# Patient Record
Sex: MALE | Race: BLACK OR AFRICAN AMERICAN | NOT HISPANIC OR LATINO | URBAN - METROPOLITAN AREA
[De-identification: names, ages, dates, MRNs, and addresses within clinical notes are randomized per-mention and may not be internally consistent; named-entity substitution may affect disease eponyms.]

---

## 2022-08-13 ENCOUNTER — HOSPITAL ENCOUNTER (EMERGENCY)
Facility: OTHER | Age: 37
Discharge: HOME | End: 2022-08-13
Attending: EMERGENCY MEDICINE
Payer: COMMERCIAL

## 2022-08-13 VITALS
OXYGEN SATURATION: 98 % | RESPIRATION RATE: 19 BRPM | HEIGHT: 72 IN | SYSTOLIC BLOOD PRESSURE: 140 MMHG | HEART RATE: 72 BPM | WEIGHT: 315 LBS | TEMPERATURE: 98 F | DIASTOLIC BLOOD PRESSURE: 75 MMHG | BODY MASS INDEX: 42.66 KG/M2

## 2022-08-13 DIAGNOSIS — R07.9 CHEST PAIN: Primary | ICD-10-CM

## 2022-08-13 LAB
ALBUMIN SERPL BCP-MCNC: 4.1 G/DL (ref 3.5–5.2)
ALP SERPL-CCNC: 65 U/L (ref 55–135)
ALT SERPL W/O P-5'-P-CCNC: 24 U/L (ref 10–44)
ANION GAP SERPL CALC-SCNC: 12 MMOL/L (ref 8–16)
AST SERPL-CCNC: 21 U/L (ref 10–40)
BASOPHILS # BLD AUTO: 0.02 K/UL (ref 0–0.2)
BASOPHILS NFR BLD: 0.4 % (ref 0–1.9)
BILIRUB SERPL-MCNC: 1.1 MG/DL (ref 0.1–1)
BNP SERPL-MCNC: 12 PG/ML (ref 0–99)
BUN SERPL-MCNC: 9 MG/DL (ref 6–20)
CALCIUM SERPL-MCNC: 9.9 MG/DL (ref 8.7–10.5)
CHLORIDE SERPL-SCNC: 103 MMOL/L (ref 95–110)
CO2 SERPL-SCNC: 26 MMOL/L (ref 23–29)
CREAT SERPL-MCNC: 1.3 MG/DL (ref 0.5–1.4)
D DIMER PPP IA.FEU-MCNC: 0.25 MG/L FEU
DIFFERENTIAL METHOD: ABNORMAL
EOSINOPHIL # BLD AUTO: 0.1 K/UL (ref 0–0.5)
EOSINOPHIL NFR BLD: 1.6 % (ref 0–8)
ERYTHROCYTE [DISTWIDTH] IN BLOOD BY AUTOMATED COUNT: 13.1 % (ref 11.5–14.5)
EST. GFR  (NO RACE VARIABLE): >60 ML/MIN/1.73 M^2
GLUCOSE SERPL-MCNC: 100 MG/DL (ref 70–110)
HCT VFR BLD AUTO: 42.9 % (ref 40–54)
HGB BLD-MCNC: 14.8 G/DL (ref 14–18)
IMM GRANULOCYTES # BLD AUTO: 0.02 K/UL (ref 0–0.04)
IMM GRANULOCYTES NFR BLD AUTO: 0.4 % (ref 0–0.5)
LYMPHOCYTES # BLD AUTO: 1.4 K/UL (ref 1–4.8)
LYMPHOCYTES NFR BLD: 26.2 % (ref 18–48)
MCH RBC QN AUTO: 31.3 PG (ref 27–31)
MCHC RBC AUTO-ENTMCNC: 34.5 G/DL (ref 32–36)
MCV RBC AUTO: 91 FL (ref 82–98)
MONOCYTES # BLD AUTO: 0.4 K/UL (ref 0.3–1)
MONOCYTES NFR BLD: 7.2 % (ref 4–15)
NEUTROPHILS # BLD AUTO: 3.3 K/UL (ref 1.8–7.7)
NEUTROPHILS NFR BLD: 64.2 % (ref 38–73)
NRBC BLD-RTO: 0 /100 WBC
PLATELET # BLD AUTO: 206 K/UL (ref 150–450)
PMV BLD AUTO: 11 FL (ref 9.2–12.9)
POTASSIUM SERPL-SCNC: 4.1 MMOL/L (ref 3.5–5.1)
PROT SERPL-MCNC: 7.6 G/DL (ref 6–8.4)
RBC # BLD AUTO: 4.73 M/UL (ref 4.6–6.2)
SODIUM SERPL-SCNC: 141 MMOL/L (ref 136–145)
TROPONIN I SERPL DL<=0.01 NG/ML-MCNC: 0.01 NG/ML (ref 0–0.03)
WBC # BLD AUTO: 5.16 K/UL (ref 3.9–12.7)

## 2022-08-13 PROCEDURE — 93005 ELECTROCARDIOGRAM TRACING: CPT

## 2022-08-13 PROCEDURE — 80053 COMPREHEN METABOLIC PANEL: CPT | Performed by: PHYSICIAN ASSISTANT

## 2022-08-13 PROCEDURE — 93010 EKG 12-LEAD: ICD-10-PCS | Mod: ,,, | Performed by: INTERNAL MEDICINE

## 2022-08-13 PROCEDURE — 85025 COMPLETE CBC W/AUTO DIFF WBC: CPT | Performed by: PHYSICIAN ASSISTANT

## 2022-08-13 PROCEDURE — 84484 ASSAY OF TROPONIN QUANT: CPT | Performed by: PHYSICIAN ASSISTANT

## 2022-08-13 PROCEDURE — 99284 EMERGENCY DEPT VISIT MOD MDM: CPT | Mod: 25

## 2022-08-13 PROCEDURE — 93010 ELECTROCARDIOGRAM REPORT: CPT | Mod: ,,, | Performed by: INTERNAL MEDICINE

## 2022-08-13 PROCEDURE — 83880 ASSAY OF NATRIURETIC PEPTIDE: CPT | Performed by: PHYSICIAN ASSISTANT

## 2022-08-13 PROCEDURE — 25000003 PHARM REV CODE 250: Performed by: PHYSICIAN ASSISTANT

## 2022-08-13 PROCEDURE — 85379 FIBRIN DEGRADATION QUANT: CPT | Performed by: PHYSICIAN ASSISTANT

## 2022-08-13 PROCEDURE — 36415 COLL VENOUS BLD VENIPUNCTURE: CPT | Performed by: PHYSICIAN ASSISTANT

## 2022-08-13 RX ORDER — IBUPROFEN 600 MG/1
600 TABLET ORAL
Status: COMPLETED | OUTPATIENT
Start: 2022-08-13 | End: 2022-08-13

## 2022-08-13 RX ADMIN — IBUPROFEN 600 MG: 600 TABLET ORAL at 12:08

## 2022-08-13 NOTE — ED PROVIDER NOTES
Encounter Date: 8/13/2022       History     Chief Complaint   Patient presents with    Chest Pain     Referred from Urgent Care C/o non radiating continuous left chest pain 6/10 x 1 day with no relief. -SOB VSS.     Patient is a 37-year-old male with no significant medical history presents to the emergency department with chest pain.  Patient states he is here visiting from [place].  He states on Wednesday evening he was doing some heavy lifting for working out.  He states he did not notice any pain right after.  He states there is a he woke up with a sharp burning pain under his left breast.  He states he is seen and it was acid reflux.  He states he took Tums and other over-the-counter medications with no relief of his symptoms.  He states the pain persisted.  He states the pain was worse with lying flat and also taking deep breaths.  He denies any recent illness.  He denies recent cough.  He denies shortness of breath.  He denies lower extremity swelling.  He states he did just travel from [place] to [place] to [place].  States he was seen at urgent care just prior to arrival and sent here for evaluation.    The history is provided by the patient.     Review of patient's allergies indicates:   Allergen Reactions    Sulfa (sulfonamide antibiotics)      History reviewed. No pertinent past medical history.  History reviewed. No pertinent surgical history.  History reviewed. No pertinent family history.  Social History     Tobacco Use    Smoking status: Never Smoker    Smokeless tobacco: Never Used   Substance Use Topics    Alcohol use: Yes     Comment: occ    Drug use: Not Currently     Types: Marijuana     Review of Systems   Constitutional: Negative for activity change, appetite change, chills, fatigue and fever.   HENT: Negative for congestion, ear discharge, ear pain, postnasal drip, rhinorrhea, sore throat and trouble swallowing.    Respiratory: Negative for cough and shortness of breath.     Cardiovascular: Positive for chest pain. Negative for palpitations and leg swelling.   Gastrointestinal: Negative for abdominal pain, blood in stool, constipation, diarrhea, nausea and vomiting.   Genitourinary: Negative for dysuria, flank pain and hematuria.   Musculoskeletal: Negative for back pain, neck pain and neck stiffness.   Skin: Negative for rash and wound.   Neurological: Negative for dizziness, weakness, light-headedness and headaches.       Physical Exam     Initial Vitals [08/13/22 1019]   BP Pulse Resp Temp SpO2   (!) 142/83 75 18 97.8 °F (36.6 °C) 98 %      MAP       --         Physical Exam    Nursing note and vitals reviewed.  Constitutional: He appears well-developed and well-nourished. He is not diaphoretic.  Non-toxic appearance. No distress.   HENT:   Head: Normocephalic.   Right Ear: Hearing and external ear normal.   Left Ear: Hearing and external ear normal.   Nose: Nose normal.   Mouth/Throat: Uvula is midline, oropharynx is clear and moist and mucous membranes are normal. No oropharyngeal exudate.   Eyes: Conjunctivae are normal. Pupils are equal, round, and reactive to light.   Neck:   Normal range of motion.  Cardiovascular: Normal rate and regular rhythm.   No lower extremity edema   Pulmonary/Chest: Breath sounds normal. No respiratory distress. He has no wheezes. He has no rhonchi. He has no rales. He exhibits no tenderness.   Abdominal: Abdomen is soft. Bowel sounds are normal. There is no abdominal tenderness.   Musculoskeletal:      Cervical back: Normal range of motion.     Lymphadenopathy:     He has no cervical adenopathy.   Neurological: He is alert and oriented to person, place, and time.   Skin: Skin is warm and dry. Capillary refill takes less than 2 seconds.   Psychiatric: He has a normal mood and affect.         ED Course   Procedures  Labs Reviewed   CBC W/ AUTO DIFFERENTIAL - Abnormal; Notable for the following components:       Result Value    MCH 31.3 (*)     All  other components within normal limits   COMPREHENSIVE METABOLIC PANEL - Abnormal; Notable for the following components:    Total Bilirubin 1.1 (*)     All other components within normal limits   TROPONIN I   B-TYPE NATRIURETIC PEPTIDE   D DIMER, QUANTITATIVE   D DIMER, QUANTITATIVE     EKG Readings: (Independently Interpreted)   Initial Reading: No STEMI. Rhythm: Normal Sinus Rhythm. Heart Rate: 77.       Imaging Results          X-Ray Chest AP Portable (Final result)  Result time 08/13/22 11:31:09    Final result by Magalie Ontiveros MD (08/13/22 11:31:09)                 Impression:      No acute abnormality.      Electronically signed by: Magalie Ontiveros MD  Date:    08/13/2022  Time:    11:31             Narrative:    EXAMINATION:  XR CHEST AP PORTABLE    CLINICAL HISTORY:  Chest Pain;    TECHNIQUE:  Single frontal view of the chest was performed.    COMPARISON:  None    FINDINGS:  The lungs are clear with normal appearance of pulmonary vasculature. No pleural effusion. No evident pneumothorax.    The cardiac silhouette is normal in size. The hilar and mediastinal contours are unremarkable.    Bones are intact.                              X-Rays:   Independently Interpreted Readings:   Other Readings:  No acute cardiopulmonary process    Medications - No data to display  Medical Decision Making:   Initial Assessment:   Urgent evaluation of a 37-year-old male with no significant medical history presents to the emergency department with chest pain.  Patient is afebrile, nontoxic appearing, and hemodynamically stable.  Normal heart sounds.  Normal lung sounds.  Chest pain is not reproducible on exam.  No lower extremity edema.  Patient's chest pain has been constant for over 24 hours.  If cardiac etiology I would suspect troponin to be elevated.  Will obtain cardiac workup.  Chest x-ray and EKG will be obtained.  Patient has had much travel over the last couple of weeks.  Will also add on a dimer.  Clinical Tests:    Lab Tests: Ordered and Reviewed  Radiological Study: Ordered and Reviewed  ED Management:  12:34 PM  No significant lab abnormality noted.  Troponin is normal.  Dimer is normal.  Chest x-ray reveals no acute cardiopulmonary process.  EKG shows no acute ischemia.  I do not feel that further workup is warranted at this time.  Patient is advised to follow up outpatient with Cardiology and PCP.  Given strict return precautions.                      Clinical Impression:   Final diagnoses:  [R07.9] Chest pain (Primary)          ED Disposition Condition    Discharge Stable        ED Prescriptions     None        Follow-up Information     Follow up With Specialties Details Why Contact Info    Cardiology  In 1 week             Jamilah Back PA-C  08/13/22 1809

## 2022-08-13 NOTE — ED TRIAGE NOTES
36 yo male reports to ED with c/o L thoracic pain worsened with inspiration/expiration starting 24 hours ago. Reports not being able to lay flat without pain/sob. Denies PMH. Denies NVD/recent fevers.

## 2022-12-05 ENCOUNTER — OFFICE (OUTPATIENT)
Dept: URBAN - METROPOLITAN AREA CLINIC 67 | Facility: CLINIC | Age: 37
End: 2022-12-05

## 2022-12-05 VITALS — HEIGHT: 72 IN | DIASTOLIC BLOOD PRESSURE: 90 MMHG | WEIGHT: 315 LBS | SYSTOLIC BLOOD PRESSURE: 151 MMHG

## 2022-12-05 DIAGNOSIS — K59.00 CONSTIPATION, UNSPECIFIED: ICD-10-CM

## 2022-12-05 DIAGNOSIS — R19.4 CHANGE IN BOWEL HABITS: ICD-10-CM

## 2022-12-05 DIAGNOSIS — K62.5 RECTAL BLEEDING: ICD-10-CM

## 2022-12-05 PROCEDURE — 99243 OFF/OP CNSLTJ NEW/EST LOW 30: CPT | Performed by: NURSE PRACTITIONER

## 2022-12-05 NOTE — SERVICEHPINOTES
This is a very pleasant   37   year old  male   seen   in consultation at the request of Dr. MIREILLE RIVERA  .Patient presents for evaluation for intermittent rectal bleeding, which first began a couple of months ago. He mostly notices it with wiping, however in the past he believes he has noticed some blood mixed in with the stool. He states overall it is improving. His PCP performed a FIT test, which was negative. He denies having any known hemorrhoids or any family history of colon cancer. This is a new problem he states. The last time he noticed the blood with wiping was a couple of weeks ago. He states it was a small amount and he was straining more at those times. He got concerned as it was happening consistently at the time. He has been having about 2-3 bowel movements per day, which has been his normal pattern, however he states the stool has become softer. He thought he noticed some red stool this am, however he remembered that he ate beets yesterday. He denies having any rectal or abdominal pain or any discomfort with the bleeding. It only occurs in relation to his bowel movements. He does not feel any rectal masses or discomfort upon wiping. Patient denies fever, nausea, vomiting, dysphagia, reflux, abdominal pain, diarrhea, melena, and significant change in weight.  He was previously more on the constipated side and that has been improving with dietary modification. He is morbidly obese and has been trying to lose weight and working with his PCP to improve his health, he has intentionally lost a couple of pounds already. 
br
brIn terms of his overall health he has been working with his PCP to monitor his blood pressure, it was moderately elevated in the office today. He does report overall better readings at home. He does have anxiety and is working with a therapist. He states that he was in the emergency room back in May with a panic attack and at that time he had an EKG which was normal and he was having shortness of breath at the time which resolved and has not recurred recently. He is active and exercising multiple days per week and he never has any chest pain or shortness of breath at rest or with activity. He denies having any cardiac or respiratory problems or any prior history of MI or stroke or taking any medications. He states that his PCP has been monitoring his kidney function, his last creatinine per PCP note was 1.29.

## 2023-04-19 ENCOUNTER — PATIENT MESSAGE (OUTPATIENT)
Dept: RESEARCH | Facility: HOSPITAL | Age: 38
End: 2023-04-19
Payer: COMMERCIAL